# Patient Record
(demographics unavailable — no encounter records)

---

## 2021-08-23 NOTE — NUR
ASSUMED CARE. PT RESTING QUIETLY, NO ACUTE DISTRESS NOTED, RESP EVEN AND 
UNLABORED. NO PAIN OR DISCOMFORT NOTED. CALL LIGHT WITHIN REACH. WILL CONTINUE 
TO MONITOR PT.

## 2021-08-23 NOTE — NUR
patient came in to the er sent by PMD for hyperkalemia, denies chest pain, c/o 
headache x 10 days. On room air, breathing evenly and unlabored. Connected to 
the monitor and pulse ox. kept comfortable, will continue to monitor 
accordingly.

## 2021-08-24 NOTE — NUR
RN OPENING NOTES

Patient seen comfortably lying in bed, no apparent distress noted, respirations even and 
unlabored, no SOB, denies any pain or discomfort at this time. Safety measures maintained, 
brakes locked, side rails up X 2. Call light left within reach, will monitor closely for any 
changes.

## 2021-08-24 NOTE — NUR
Patient arrived to unit at 0030. A&Ox4. VSS. Ambulatory with skin intact. Patients heart 
rate and rhythm regular upon auscultation. NSR on monitor. Lung sounds clear with chest 
movement even and unlabored. Patient states she feels fine and normal besides her R arm is a 
little sore from positioning earlier in the day. Oriented patient to unit protocols and bed 
control/call light/tv remote.

## 2021-08-24 NOTE — NUR
Patient has been A&Ox4, sleeping well though easy to wake since admission. Fluids infusing 
to RAC #20G -IV patent and intact. still NSR on monitor. No hypo or hyperglycemic reactions 
noted.

## 2021-08-24 NOTE — NUR
TELE RN OPENING NOTE



RECEIVED PT AWAKE IN BED. A/O X4. PT IS STABLE ON ROOM AIR, NO SOB OR S/S OF RESPIRATORY 
DISTRESS NOTED. PT ON EXTERNAL CARDIAC MONITOR READING SR IN THE 70'S. PT HAS NO C/O PAIN OR 
DISCOMFORT AT THIS TIME. IV ACCESS NOTED IN RAC #20 INFUSING 1/2NS @ 75 ML/HR, INTACT AND 
PATENT. SAFETY PRECAUTIONS MAINTAINED. BED IN LOWEST LOCKED POSITION, HOB ELEVATED, SIDE 
RAILS UP X2. CALL LIGHT AND TABLE WITHIN REACH. WILL CONTINUE WITH PLAN OF CARE

## 2021-08-24 NOTE — NUR
RN NOTE



[facility] 52 UNITS SQ HS WITHHELD DUE TO BLOOD SUGAR . 2 UNITS REGULAR INSULIN GIVEN PER 
SLIDING SCALE. MD MADE AWARE. WILL CONTINUE TO MONITOR.

## 2021-08-24 NOTE — NUR
RN CLOSING NOTES

Patient in bed, AOX4, able to follow commands, can make needs known, denies any pain or 
discomfort at this time, no apparent distress noted. Resident has a new order to start 
Rocephin 1g intravenously od for pneumonia, respirations even and unlabored, no SOB, 
tolerating ATB well, no adverse effects noted at this time, IV site free from any 
infiltration. Safety measures maintained, brakes locked, side rails up X 2. All needs 
attended, call light left within reach, will endorse to next shift for continuity of care.

## 2021-08-24 NOTE — NUR
[facility] WITH [facility] AND RN AT BEDSIDE WITH ACLS PROTOCOL. 
NAD NOTED DURING TRANSPORT. PT AMBULATED FROM GURNEY TO BED ON STEADY GAIT

## 2021-08-25 NOTE — NUR
TELE RN CLOSING NOTE



PT IS AWAKE IN BED. A/O X4. PT IS STABLE ON ROOM AIR, NO SOB OR S/S OF RESPIRATORY DISTRESS 
NOTED. PT ON EXTERNAL CARDIAC MONITOR READING SR IN THE 70'S. PT HAS NO C/O PAIN OR 
DISCOMFORT AT THIS TIME. IV ACCESS IS INTACT, PATENT, AND FLUSHING WELL. ALL NEEDS HAVE BEEN 
MET. PAIN MANAGEMENT ADMINISTERED PER ORDER. SAFETY PRECAUTIONS MAINTAINED AT ALL TIMES. BED 
IN LOWEST LOCKED POSITION, HOB ELEVATED, SIDE RAILS UP X2. CALL LIGHT AND TABLE WITHIN 
REACH. WILL ENDORSE TO ONCOMING NURSE FOR NAIF.

## 2021-08-25 NOTE — NUR
Patient discharged home today, no c/o pain or discomfort, no apparent distress noted, 
breathing even and unlabored. Patient left hospital at 12:55pm, all discharge paperwork 
signed by patient, health teaching provided regarding medications and diet, verbalized 
understanding, all belongings and home medications taken by patient. Patient's new 
prescription electronically transmitted to patient's preferred pharmacy. Patient accompanied 
by CNA to the parking lot, left in stable condition.

## 2021-08-25 NOTE — NUR
RN OPENING NOTES

Patient seen lying in bed, respirations even and unlabored, no SOB, no apparent distress 
noted, denies any pain or discomfort at this time. Safety measures maintained, brakes 
locked, side rails up X 2. Call light left within reach, will monitor closely for any 
changes.